# Patient Record
Sex: FEMALE | Race: WHITE | Employment: UNEMPLOYED | URBAN - METROPOLITAN AREA
[De-identification: names, ages, dates, MRNs, and addresses within clinical notes are randomized per-mention and may not be internally consistent; named-entity substitution may affect disease eponyms.]

---

## 2019-08-21 ENCOUNTER — ANESTHESIA EVENT (OUTPATIENT)
Dept: ANESTHESIOLOGY | Facility: HOSPITAL | Age: 54
End: 2019-08-21

## 2019-08-21 ENCOUNTER — ANESTHESIA (OUTPATIENT)
Dept: ANESTHESIOLOGY | Facility: HOSPITAL | Age: 54
End: 2019-08-21

## 2019-08-21 NOTE — ANESTHESIA POSTPROCEDURE EVALUATION
Post-Op Assessment Note    CV Status:  Stable  Pain Score: 0    Pain management: adequate     Mental Status:  Alert and awake   Hydration Status:  Euvolemic   PONV Controlled:  Controlled   Airway Patency:  Patent   Post Op Vitals Reviewed: Yes      Staff: CRNA           BP (P) 129/67 (08/21/19 0701)    Temp (!) (P) 97.3 °F (36.3 °C) (08/21/19 0701)    Pulse (P) 76 (08/21/19 0701)   Resp (P) 15 (08/21/19 0701)    SpO2 (P) 99 % (08/21/19 0701)

## 2022-08-22 ENCOUNTER — APPOINTMENT (OUTPATIENT)
Dept: RADIOLOGY | Facility: HOSPITAL | Age: 57
End: 2022-08-22
Payer: COMMERCIAL

## 2022-08-22 ENCOUNTER — ANESTHESIA EVENT (EMERGENCY)
Dept: PERIOP | Facility: HOSPITAL | Age: 57
End: 2022-08-22
Payer: COMMERCIAL

## 2022-08-22 ENCOUNTER — HOSPITAL ENCOUNTER (EMERGENCY)
Facility: HOSPITAL | Age: 57
Discharge: HOME | End: 2022-08-23
Attending: EMERGENCY MEDICINE | Admitting: EMERGENCY MEDICINE
Payer: COMMERCIAL

## 2022-08-22 ENCOUNTER — ANESTHESIA (EMERGENCY)
Dept: PERIOP | Facility: HOSPITAL | Age: 57
End: 2022-08-22
Payer: COMMERCIAL

## 2022-08-22 ENCOUNTER — APPOINTMENT (EMERGENCY)
Dept: PERIOP | Facility: HOSPITAL | Age: 57
End: 2022-08-22
Attending: INTERNAL MEDICINE
Payer: COMMERCIAL

## 2022-08-22 DIAGNOSIS — K25.9 GASTRIC ULCER WITHOUT HEMORRHAGE OR PERFORATION, UNSPECIFIED CHRONICITY: ICD-10-CM

## 2022-08-22 DIAGNOSIS — T18.128A ESOPHAGEAL OBSTRUCTION DUE TO FOOD IMPACTION: Primary | ICD-10-CM

## 2022-08-22 DIAGNOSIS — K22.10 EROSIVE ESOPHAGITIS: ICD-10-CM

## 2022-08-22 DIAGNOSIS — K22.2 ESOPHAGEAL OBSTRUCTION DUE TO FOOD IMPACTION: Primary | ICD-10-CM

## 2022-08-22 PROCEDURE — 43247 EGD REMOVE FOREIGN BODY: CPT | Performed by: INTERNAL MEDICINE

## 2022-08-22 PROCEDURE — NC001 PR NO CHARGE: Performed by: INTERNAL MEDICINE

## 2022-08-22 PROCEDURE — 99214 OFFICE O/P EST MOD 30 MIN: CPT | Performed by: INTERNAL MEDICINE

## 2022-08-22 PROCEDURE — 99284 EMERGENCY DEPT VISIT MOD MDM: CPT

## 2022-08-22 PROCEDURE — 99284 EMERGENCY DEPT VISIT MOD MDM: CPT | Performed by: EMERGENCY MEDICINE

## 2022-08-22 PROCEDURE — 96374 THER/PROPH/DIAG INJ IV PUSH: CPT

## 2022-08-22 RX ORDER — ONDANSETRON 2 MG/ML
INJECTION INTRAMUSCULAR; INTRAVENOUS AS NEEDED
Status: DISCONTINUED | OUTPATIENT
Start: 2022-08-22 | End: 2022-08-23

## 2022-08-22 RX ORDER — PANTOPRAZOLE SODIUM 40 MG/1
40 TABLET, DELAYED RELEASE ORAL
Qty: 120 TABLET | Refills: 1 | Status: SHIPPED | OUTPATIENT
Start: 2022-08-22

## 2022-08-22 RX ORDER — SUCCINYLCHOLINE/SOD CL,ISO/PF 100 MG/5ML
SYRINGE (ML) INTRAVENOUS AS NEEDED
Status: DISCONTINUED | OUTPATIENT
Start: 2022-08-22 | End: 2022-08-23

## 2022-08-22 RX ORDER — SODIUM CHLORIDE, SODIUM LACTATE, POTASSIUM CHLORIDE, CALCIUM CHLORIDE 600; 310; 30; 20 MG/100ML; MG/100ML; MG/100ML; MG/100ML
INJECTION, SOLUTION INTRAVENOUS CONTINUOUS PRN
Status: DISCONTINUED | OUTPATIENT
Start: 2022-08-22 | End: 2022-08-23

## 2022-08-22 RX ORDER — GLYCOPYRROLATE 0.2 MG/ML
INJECTION INTRAMUSCULAR; INTRAVENOUS AS NEEDED
Status: DISCONTINUED | OUTPATIENT
Start: 2022-08-22 | End: 2022-08-23

## 2022-08-22 RX ORDER — LIDOCAINE HYDROCHLORIDE 20 MG/ML
INJECTION, SOLUTION EPIDURAL; INFILTRATION; INTRACAUDAL; PERINEURAL AS NEEDED
Status: DISCONTINUED | OUTPATIENT
Start: 2022-08-22 | End: 2022-08-23

## 2022-08-22 RX ORDER — DEXAMETHASONE SODIUM PHOSPHATE 10 MG/ML
INJECTION, SOLUTION INTRAMUSCULAR; INTRAVENOUS AS NEEDED
Status: DISCONTINUED | OUTPATIENT
Start: 2022-08-22 | End: 2022-08-23

## 2022-08-22 RX ORDER — PROPOFOL 10 MG/ML
INJECTION, EMULSION INTRAVENOUS AS NEEDED
Status: DISCONTINUED | OUTPATIENT
Start: 2022-08-22 | End: 2022-08-23

## 2022-08-22 RX ADMIN — ONDANSETRON 4 MG: 2 INJECTION INTRAMUSCULAR; INTRAVENOUS at 23:36

## 2022-08-22 RX ADMIN — PROPOFOL 250 MG: 10 INJECTION, EMULSION INTRAVENOUS at 23:34

## 2022-08-22 RX ADMIN — DEXAMETHASONE SODIUM PHOSPHATE 10 MG: 10 INJECTION, SOLUTION INTRAMUSCULAR; INTRAVENOUS at 23:36

## 2022-08-22 RX ADMIN — GLYCOPYRROLATE 0.2 MG: 0.2 INJECTION, SOLUTION INTRAMUSCULAR; INTRAVENOUS at 23:15

## 2022-08-22 RX ADMIN — LIDOCAINE HYDROCHLORIDE 100 MG: 20 INJECTION, SOLUTION EPIDURAL; INFILTRATION; INTRACAUDAL; PERINEURAL at 23:34

## 2022-08-22 RX ADMIN — Medication 120 MG: at 23:34

## 2022-08-22 RX ADMIN — GLUCAGON HYDROCHLORIDE 1 MG: KIT at 22:20

## 2022-08-22 RX ADMIN — PROPOFOL 50 MG: 10 INJECTION, EMULSION INTRAVENOUS at 23:45

## 2022-08-22 RX ADMIN — SODIUM CHLORIDE, SODIUM LACTATE, POTASSIUM CHLORIDE, AND CALCIUM CHLORIDE: .6; .31; .03; .02 INJECTION, SOLUTION INTRAVENOUS at 23:29

## 2022-08-23 VITALS
DIASTOLIC BLOOD PRESSURE: 58 MMHG | OXYGEN SATURATION: 100 % | SYSTOLIC BLOOD PRESSURE: 133 MMHG | WEIGHT: 250 LBS | TEMPERATURE: 97.5 F | BODY MASS INDEX: 40.18 KG/M2 | RESPIRATION RATE: 18 BRPM | HEIGHT: 66 IN | HEART RATE: 82 BPM

## 2022-08-23 RX ORDER — HYDROMORPHONE HCL IN WATER/PF 6 MG/30 ML
0.2 PATIENT CONTROLLED ANALGESIA SYRINGE INTRAVENOUS
Status: DISCONTINUED | OUTPATIENT
Start: 2022-08-23 | End: 2022-08-23 | Stop reason: HOSPADM

## 2022-08-23 NOTE — ED NOTES
Pt attempted dysphagia screening  Took small sip of water and immediately vomited it up        Abhi Sanders, BEN  08/22/22 5142

## 2022-08-23 NOTE — ANESTHESIA PREPROCEDURE EVALUATION
Procedure:  EGD    Past Medical History:   Diagnosis Date    Esophageal obstruction due to food impaction      ETT Properties Placement Date: 12/22/20  - Placement Time: 0626 -JH Mask Ventilation: Mask ventilation not attempted (0)  -JH Preoxygenated: Yes  - Technique: Direct laryngoscopy;Rapid sequence;Stylet  - Type: Cuffed;Oral  -JH Tube Size: 7 mm  -JH Laryngoscope: Mac  - Blade Size: 3  -JH Location: Oral  - Grade View: 1  -JH Insertion: Atraumatic; No change in dentition  - Insertion attempts: 1  -JH Placement Verification: Auscultation; End tidal CO2  -JH Secured at (cm): 22  -JH Removal Date: 12/22/20  -FD Removal Time: 8019 -FD     Lab Results   Component Value Date    WBC 9 80 08/21/2019    HGB 14 9 08/21/2019    HCT 45 9 08/21/2019    MCV 88 08/21/2019     08/21/2019       Chemistry        Component Value Date/Time    K 3 4 (L) 08/21/2019 0136     08/21/2019 0136    CO2 25 08/21/2019 0136    BUN 17 08/21/2019 0136    CREATININE 0 77 08/21/2019 0136        Component Value Date/Time    CALCIUM 9 2 08/21/2019 0136    ALKPHOS 102 08/21/2019 0136    AST 22 08/21/2019 0136    ALT 26 08/21/2019 0136            Physical Exam    Airway    Mallampati score: II  TM Distance: >3 FB  Neck ROM: full     Dental   No notable dental hx     Cardiovascular  Rhythm: regular, Rate: normal,     Pulmonary  Breath sounds clear to auscultation,     Other Findings  Intercisor Distance > 3cm          Anesthesia Plan  ASA Score- 3 Emergent    Anesthesia Type- general with ASA Monitors  Additional Monitors:   Airway Plan: ETT  Comment: Discussed benefits/risks of general anesthesia including possibility of mouth/throat pain, injury to lips/teeth, nausea/vomiting, and surgical pain along with more rare complications such as stroke, MI, pneumonia, aspiration, and injury to blood vessels  Patient understands and wishes to proceed  All questions answered            Plan Factors-Exercise tolerance (METS): >4 METS  Chart reviewed  EKG reviewed  Existing labs reviewed  Induction- intravenous and rapid sequence induction  Postoperative Plan- Plan for postoperative opioid use  Planned trial extubation    Informed Consent- Anesthetic plan and risks discussed with patient  I personally reviewed this patient with the CRNA  Discussed and agreed on the Anesthesia Plan with the CRNA  Alicia Onofre

## 2022-08-23 NOTE — ED PROVIDER NOTES
History  Chief Complaint   Patient presents with    Foreign Body in [address] eating steak for dinner  Piece stuck in throat  Pt speaking in full sentences  Able to handle secretions  Failed dysphagia screen in triage  Patient is a 57-year-old female with a past medical history significant for hypertension, status post cholecystectomy, prior food bolus  Having required EGD for removal who today presents with foreign body in throat  Patient states that at 7:30 p m , she was eating steak for dinner, had 2 bites and then felt like a piece of steak was stuck in her throat  She states that she tried drinking water, forcing herself to vomit, but the steak still feels stuck  She is unable to swallow her own spit  She states that she previously had EGD from food bolus in  and prior to that she was diagnosed with esophagitis  Prior to Admission Medications   Prescriptions Last Dose Informant Patient Reported? Taking? Diclofenac Sodium (VOLTAREN) 1 % Not Taking at Unknown time  No No   Sig: Apply 2 g topically 4 (four) times a day   Patient not taking: No sig reported   pantoprazole (PROTONIX) 40 mg tablet Not Taking at Unknown time  No No   Sig: TAKE 1 TABLET BY MOUTH TWICE DAILY BEFORE  MEALS   Patient not taking: No sig reported      Facility-Administered Medications: None       Past Medical History:   Diagnosis Date    Esophageal obstruction due to food impaction        Past Surgical History:   Procedure Laterality Date     SECTION      GALLBLADDER SURGERY      UPPER GASTROINTESTINAL ENDOSCOPY         Family History   Problem Relation Age of Onset    Colon cancer Neg Hx     Colon polyps Neg Hx      I have reviewed and agree with the history as documented      E-Cigarette/Vaping    E-Cigarette Use Never User      E-Cigarette/Vaping Substances    Nicotine No     THC No     CBD No     Flavoring No     Other No     Unknown No      Social History     Tobacco Use    Smoking status: Never Smoker    Smokeless tobacco: Never Used   Vaping Use    Vaping Use: Never used   Substance Use Topics    Alcohol use: Never    Drug use: Never       Review of Systems   Constitutional: Negative for chills and fever  HENT: Positive for drooling and trouble swallowing  Negative for congestion, rhinorrhea and voice change  Eyes: Negative for photophobia and visual disturbance  Respiratory: Negative for cough and shortness of breath  Cardiovascular: Negative for chest pain and palpitations  Gastrointestinal: Negative for abdominal pain, constipation, diarrhea, nausea and vomiting  Genitourinary: Negative for dysuria, flank pain and hematuria  Musculoskeletal: Negative for back pain and neck pain  Skin: Negative for color change and pallor  Neurological: Negative for dizziness, weakness, light-headedness, numbness and headaches  Physical Exam  Physical Exam  Vitals and nursing note reviewed  Constitutional:       General: She is not in acute distress  Appearance: Normal appearance  She is not ill-appearing, toxic-appearing or diaphoretic  HENT:      Head: Normocephalic and atraumatic  Mouth/Throat:      Mouth: Mucous membranes are moist    Eyes:      Extraocular Movements: Extraocular movements intact  Conjunctiva/sclera: Conjunctivae normal       Pupils: Pupils are equal, round, and reactive to light  Neck:      Trachea: Trachea and phonation normal    Cardiovascular:      Rate and Rhythm: Normal rate and regular rhythm  Pulses: Normal pulses  Heart sounds: Normal heart sounds  No murmur heard  Pulmonary:      Effort: Pulmonary effort is normal  No respiratory distress  Breath sounds: Normal breath sounds  No stridor  No wheezing, rhonchi or rales  Chest:      Chest wall: No tenderness  Abdominal:      General: Bowel sounds are normal  There is no distension  Palpations: Abdomen is soft  Tenderness:  There is no abdominal tenderness  There is no guarding or rebound  Musculoskeletal:      Cervical back: Neck supple  Right lower leg: No edema  Left lower leg: No edema  Skin:     General: Skin is warm and dry  Neurological:      General: No focal deficit present  Mental Status: She is alert and oriented to person, place, and time  Mental status is at baseline  Psychiatric:         Mood and Affect: Mood normal          Behavior: Behavior normal          Vital Signs  ED Triage Vitals [08/22/22 2130]   Temperature Pulse Respirations Blood Pressure SpO2   (!) 97 3 °F (36 3 °C) 83 18 149/78 98 %      Temp Source Heart Rate Source Patient Position - Orthostatic VS BP Location FiO2 (%)   Temporal Monitor Sitting Right arm --      Pain Score       No Pain           Vitals:    08/22/22 2130   BP: 149/78   Pulse: 83   Patient Position - Orthostatic VS: Sitting         Visual Acuity      ED Medications  Medications   glucagon (GLUCAGEN) injection 1 mg (1 mg Intravenous Given 8/22/22 2220)       Diagnostic Studies  Results Reviewed     None                 No orders to display              Procedures  Procedures         ED Course  ED Course as of 08/22/22 2308   [facility] Aug 22, 2022   2219 Discussed with Dr Ariella Self, GI for EG in setting of steak bolus  2301 Glucagon did not work, GI mobilizing for EGD                               SBIRT 22yo+    Flowsheet Row Most Recent Value   SBIRT (25 yo +)    In order to provide better care to our patients, we are screening all of our patients for alcohol and drug use  Would it be okay to ask you these screening questions? Yes Filed at: 08/22/2022 2223   Initial Alcohol Screen: US AUDIT-C     1  How often do you have a drink containing alcohol? 0 Filed at: 08/22/2022 2223   2  How many drinks containing alcohol do you have on a typical day you are drinking? 0 Filed at: 08/22/2022 2223   3a  Male UNDER 65: How often do you have five or more drinks on one occasion?  0 Filed at: 08/22/2022 2223 3b  FEMALE Any Age, or MALE 65+: How often do you have 4 or more drinks on one occassion? 0 Filed at: 08/22/2022 2223   Audit-C Score 0 Filed at: 08/22/2022 2223   SHARA: How many times in the past year have you    Used an illegal drug or used a prescription medication for non-medical reasons? Never Filed at: 08/22/2022 2223                    MDM  Number of Diagnoses or Management Options  Esophageal obstruction due to food impaction  Diagnosis management comments: Assessment and plan:  58-year-old female presenting with food bolus  Will trial glue gone, if does not work, will discuss case with GI for urgent endoscopy for food bolus retrieval   On exam, the patient is able to speak in full sentences, but she does spit her saliva out intermittently  Disposition  Final diagnoses:   Esophageal obstruction due to food impaction     Time reflects when diagnosis was documented in both MDM as applicable and the Disposition within this note     Time User Action Codes Description Comment    8/22/2022 10:20 PM Susie Escalera [K22 2,  I44 227D] Esophageal obstruction due to food impaction       ED Disposition     ED Disposition   Send to OR    Condition   --    Date/Time   Mon Aug 22, 2022 10:20 PM    Comment   --         Follow-up Information    None         Patient's Medications   Discharge Prescriptions    No medications on file       No discharge procedures on file      PDMP Review     None          ED Provider  Electronically Signed by           Brigida Nguyen DO  08/22/22 1227

## 2022-08-23 NOTE — CONSULTS
Consultation - [facility]     Tania Villagomez 59 y o  female MRN: [id number]  Unit/Bed#: TR13B Encounter: [id number]    Inpatient consult to gastroenterology  Consult performed by: Garry Mckinney MD  Consult ordered by: Garry Mckinney MD          ASSESSMENT and PLAN    1  Esophageal food impaction, recurrent episode    58-year-old female with steak in the esophagus stuck since 6:00 p m  Jennifer Domingo Unable to clear secretions  No A/C     - NPO IV fluids  - urgent EGD in the OR with elective intubation for airway protection    Chief Complaint   Patient presents with    Foreign Body in [address] eating steak for dinner  Piece stuck in throat  Pt speaking in full sentences  Able to handle secretions  Failed dysphagia screen in triage  Physician Requesting Consult: Jonny Vargas DO    HPI  Tania Villagomez is a 59y o  year old female who presents today with history of food impaction, last in , who returns for steak stuck in her esophagus since 6:00 p m  Today  Unable to clear secretions  Historical Information   Past Medical History:   Diagnosis Date    Esophageal obstruction due to food impaction      Past Surgical History:   Procedure Laterality Date     SECTION      GALLBLADDER SURGERY      UPPER GASTROINTESTINAL ENDOSCOPY       Social History   Social History     Substance and Sexual Activity   Alcohol Use Never     Social History     Substance and Sexual Activity   Drug Use Never     Social History     Tobacco Use   Smoking Status Never Smoker   Smokeless Tobacco Never Used     Family History   Problem Relation Age of Onset    Colon cancer Neg Hx     Colon polyps Neg Hx        Meds/Allergies     No home medications   Not compliant with PPI therapy    No Known Allergies    PHYSICAL EXAM    Blood pressure 149/78, pulse 83, temperature (!) 97 3 °F (36 3 °C), temperature source Temporal, resp  rate 18, height 5' 6" (1 676 m), weight 113 kg (250 lb), SpO2 98 %   Body mass index is 40 35 kg/m²  General Appearance: NAD, cooperative, alert  Eyes: Anicteric, PERRLA, EOMI  ENT:  Normocephalic, atraumatic, normal mucosa  unable to clear secretions  Neck:  Supple, symmetrical, trachea midline  Resp:  Clear to auscultation bilaterally; no rales, rhonchi or wheezing; respirations unlabored   CV:  S1 S2, Regular rate and rhythm; no murmur, rub, or gallop  GI:  Soft, non-tender, non-distended; normal bowel sounds; no masses, no organomegaly   Rectal: Deferred  Musculoskeletal: No cyanosis, clubbing or edema  Normal ROM  Skin:  No jaundice, rashes, or lesions   Heme/Lymph: No palpable cervical lymphadenopathy  Psych: Normal affect, good eye contact  Neuro: No gross deficits, AAOx3    Lab Results   Component Value Date    CALCIUM 9 2 08/21/2019    K 3 4 (L) 08/21/2019    CO2 25 08/21/2019     08/21/2019    BUN 17 08/21/2019    CREATININE 0 77 08/21/2019     Lab Results   Component Value Date    WBC 9 80 08/21/2019    HGB 14 9 08/21/2019    HCT 45 9 08/21/2019    MCV 88 08/21/2019     08/21/2019     Lab Results   Component Value Date    ALT 26 08/21/2019    AST 22 08/21/2019    ALKPHOS 102 08/21/2019     No results found for: AMYLASE  No results found for: LIPASE  No results found for: IRON, TIBC, FERRITIN  No results found for: INR      REVIEW OF SYSTEMS:    CONSTITUTIONAL: Denies any fever, chills, rigors, and weight loss  HEENT: No earache or tinnitus  Denies hearing loss or visual disturbances  CARDIOVASCULAR: No chest pain or palpitations  RESPIRATORY: Denies any cough, hemoptysis, shortness of breath or dyspnea on exertion  GASTROINTESTINAL: As noted in the History of Present Illness  GENITOURINARY: No problems with urination  Denies any hematuria or dysuria  NEUROLOGIC: No dizziness or vertigo, denies headaches  MUSCULOSKELETAL: Denies any muscle or joint pain  SKIN: Denies skin rashes or itching     ENDOCRINE: Denies excessive thirst  Denies intolerance to heat or cold  PSYCHOSOCIAL: Denies depression or anxiety  Denies any recent memory loss

## 2022-08-23 NOTE — ANESTHESIA POSTPROCEDURE EVALUATION
Post-Op Assessment Note    CV Status:  Stable  Pain Score: 0    Pain management: adequate     Mental Status:  Alert and awake   Hydration Status:  Euvolemic   PONV Controlled:  Controlled   Airway Patency:  Patent      Post Op Vitals Reviewed: Yes      Staff: CRNA         No complications documented      BP   151/67   Temp  96 9   Pulse  86   Resp   17   SpO2   100% 4lo2 nc

## 2022-08-23 NOTE — ED NOTES
Attempted X2 for IV access, unsuccessful  Will attempt ultrasound IV placement        Elvira Adams RN  08/22/22 8352

## 2022-08-23 NOTE — OP NOTE
[facility] [address] [facility]  [facility] [place] [address]  [phone]        DATE OF SERVICE:  8/22/22     PHYSICIAN(S):  Collin Calle MD        INDICATION:  Esophageal obstruction due to food impaction     POST-OP DIAGNOSIS:  See the impression below      PREPROCEDURE:  Informed consent was obtained for the procedure, including sedation  Risks of perforation, hemorrhage, adverse drug reaction and aspiration were discussed  The patient was placed in the left lateral decubitus position      Patient was explained about the risks and benefits of the procedure  Risks including but not limited to bleeding, infection, and perforation were explained in detail  Also explained about less than 100% sensitivity with the exam and other alternatives      DETAILS OF PROCEDURE:  Patient was taken to the procedure room where a time out was performed to confirm correct patient and correct procedure  The patient underwent general anesthesia, which was administered by an anesthesia professional  The patient's blood pressure, heart rate, level of consciousness, respirations and oxygen were monitored throughout the procedure  The scope was advanced to the second part of the duodenum  Retroflexion was performed in the fundus  The patient experienced no blood loss  The procedure was not difficult  The patient tolerated the procedure well  There were no apparent complications       ANESTHESIA INFORMATION:  ASA: ASA status not filed in the log    Anesthesia Type: Anesthesia type not filed in the log      MEDICATIONS:  No administrations occurring from 2311 to 2354 on 08/22/22         FINDINGS:  · Food bolus measuring 4 cm in the middle third of the esophagus and lower third of the esophagus (28 cm from the incisors), successfully removed with retrieval snare net and snare  · Multiple small, cratered, round ulcers in the antrum  · The duodenum appeared normal   · The fundus of the stomach and incisura appeared normal   · Severe grade D esophagitis with multiple mucosal breaks measuring 5 mm or more, continuous between folds, covering 75% or more of the circumference, showing edematous, erythematous, nodular and ulcerated mucosa in the GE junction        SPECIMENS:  * No specimens in log *        IMPRESSION:  Large piece of steak stuck in the lower 3rd of the esophagus, removed with  Mew Net and snare  Grade D esophagitis with nodular GE junction  Multiple small antral ulcers  Normal duodenum      RECOMMENDATION:  Schedule repeat EGD in 2 months to follow-up ulcers and biopsy GE junction  Start PPI b i d  Resume home meds  Resume previous diet  Follow up with your primary care provider as previously scheduled  Follow up with GI for repeat EGD as stated above      Patient Disposition:  hemodynamically stable      SIGNATURE: Sherita Bain MD  DATE: August 22, 2022  TIME: 11:59 PM

## 2022-11-01 ENCOUNTER — ANESTHESIA (EMERGENCY)
Dept: PERIOP | Facility: HOSPITAL | Age: 57
End: 2022-11-01

## 2022-11-01 ENCOUNTER — APPOINTMENT (EMERGENCY)
Dept: PERIOP | Facility: HOSPITAL | Age: 57
End: 2022-11-01
Attending: INTERNAL MEDICINE

## 2022-11-01 ENCOUNTER — ANESTHESIA EVENT (EMERGENCY)
Dept: PERIOP | Facility: HOSPITAL | Age: 57
End: 2022-11-01

## 2022-11-01 ENCOUNTER — HOSPITAL ENCOUNTER (EMERGENCY)
Facility: HOSPITAL | Age: 57
Discharge: HOME | End: 2022-11-01
Attending: EMERGENCY MEDICINE

## 2022-11-01 VITALS
WEIGHT: 250 LBS | TEMPERATURE: 98 F | HEIGHT: 66 IN | BODY MASS INDEX: 40.18 KG/M2 | OXYGEN SATURATION: 100 % | SYSTOLIC BLOOD PRESSURE: 147 MMHG | HEART RATE: 85 BPM | RESPIRATION RATE: 12 BRPM | DIASTOLIC BLOOD PRESSURE: 71 MMHG

## 2022-11-01 DIAGNOSIS — T17.208A FOREIGN BODY IN PHARYNX, INITIAL ENCOUNTER: ICD-10-CM

## 2022-11-01 DIAGNOSIS — K22.2 ESOPHAGEAL OBSTRUCTION DUE TO FOOD IMPACTION: Primary | ICD-10-CM

## 2022-11-01 DIAGNOSIS — T18.128A ESOPHAGEAL OBSTRUCTION DUE TO FOOD IMPACTION: Primary | ICD-10-CM

## 2022-11-01 DIAGNOSIS — T17.208D FOREIGN BODY IN PHARYNX, SUBSEQUENT ENCOUNTER: ICD-10-CM

## 2022-11-01 LAB
ATRIAL RATE: 105 BPM
P AXIS: 48 DEGREES
PR INTERVAL: 156 MS
QRS AXIS: -27 DEGREES
QRSD INTERVAL: 84 MS
QT INTERVAL: 350 MS
QTC INTERVAL: 462 MS
T WAVE AXIS: 41 DEGREES
VENTRICULAR RATE: 105 BPM

## 2022-11-01 RX ORDER — ONDANSETRON 2 MG/ML
4 INJECTION INTRAMUSCULAR; INTRAVENOUS ONCE AS NEEDED
Status: DISCONTINUED | OUTPATIENT
Start: 2022-11-01 | End: 2022-11-02 | Stop reason: HOSPADM

## 2022-11-01 RX ORDER — ONDANSETRON 2 MG/ML
INJECTION INTRAMUSCULAR; INTRAVENOUS AS NEEDED
Status: DISCONTINUED | OUTPATIENT
Start: 2022-11-01 | End: 2022-11-01

## 2022-11-01 RX ORDER — SODIUM CHLORIDE, SODIUM LACTATE, POTASSIUM CHLORIDE, CALCIUM CHLORIDE 600; 310; 30; 20 MG/100ML; MG/100ML; MG/100ML; MG/100ML
50 INJECTION, SOLUTION INTRAVENOUS CONTINUOUS
Status: DISCONTINUED | OUTPATIENT
Start: 2022-11-01 | End: 2022-11-02 | Stop reason: HOSPADM

## 2022-11-01 RX ORDER — FENTANYL CITRATE/PF 50 MCG/ML
25 SYRINGE (ML) INJECTION
Status: DISCONTINUED | OUTPATIENT
Start: 2022-11-01 | End: 2022-11-02 | Stop reason: HOSPADM

## 2022-11-01 RX ORDER — SUCCINYLCHOLINE/SOD CL,ISO/PF 100 MG/5ML
SYRINGE (ML) INTRAVENOUS AS NEEDED
Status: DISCONTINUED | OUTPATIENT
Start: 2022-11-01 | End: 2022-11-01

## 2022-11-01 RX ORDER — PROPOFOL 10 MG/ML
INJECTION, EMULSION INTRAVENOUS AS NEEDED
Status: DISCONTINUED | OUTPATIENT
Start: 2022-11-01 | End: 2022-11-01

## 2022-11-01 RX ORDER — FENTANYL CITRATE 50 UG/ML
INJECTION, SOLUTION INTRAMUSCULAR; INTRAVENOUS AS NEEDED
Status: DISCONTINUED | OUTPATIENT
Start: 2022-11-01 | End: 2022-11-01

## 2022-11-01 RX ORDER — SODIUM CHLORIDE, SODIUM LACTATE, POTASSIUM CHLORIDE, CALCIUM CHLORIDE 600; 310; 30; 20 MG/100ML; MG/100ML; MG/100ML; MG/100ML
INJECTION, SOLUTION INTRAVENOUS CONTINUOUS PRN
Status: DISCONTINUED | OUTPATIENT
Start: 2022-11-01 | End: 2022-11-01

## 2022-11-01 RX ORDER — DEXAMETHASONE SODIUM PHOSPHATE 10 MG/ML
INJECTION, SOLUTION INTRAMUSCULAR; INTRAVENOUS AS NEEDED
Status: DISCONTINUED | OUTPATIENT
Start: 2022-11-01 | End: 2022-11-01

## 2022-11-01 RX ORDER — SODIUM CHLORIDE 9 MG/ML
INJECTION, SOLUTION INTRAVENOUS CONTINUOUS PRN
Status: DISCONTINUED | OUTPATIENT
Start: 2022-11-01 | End: 2022-11-01

## 2022-11-01 RX ADMIN — SODIUM CHLORIDE, SODIUM LACTATE, POTASSIUM CHLORIDE, AND CALCIUM CHLORIDE: .6; .31; .03; .02 INJECTION, SOLUTION INTRAVENOUS at 02:51

## 2022-11-01 RX ADMIN — ONDANSETRON 4 MG: 2 INJECTION INTRAMUSCULAR; INTRAVENOUS at 03:22

## 2022-11-01 RX ADMIN — FENTANYL CITRATE 50 MCG: 50 INJECTION, SOLUTION INTRAMUSCULAR; INTRAVENOUS at 03:21

## 2022-11-01 RX ADMIN — FENTANYL CITRATE 25 MCG: 50 INJECTION, SOLUTION INTRAMUSCULAR; INTRAVENOUS at 03:58

## 2022-11-01 RX ADMIN — DEXAMETHASONE SODIUM PHOSPHATE 10 MG: 10 INJECTION, SOLUTION INTRAMUSCULAR; INTRAVENOUS at 03:22

## 2022-11-01 RX ADMIN — PROPOFOL 300 MG: 10 INJECTION, EMULSION INTRAVENOUS at 03:15

## 2022-11-01 RX ADMIN — LIDOCAINE HYDROCHLORIDE 100 MG: 20 INJECTION INTRAVENOUS at 03:15

## 2022-11-01 RX ADMIN — FENTANYL CITRATE 25 MCG: 50 INJECTION, SOLUTION INTRAMUSCULAR; INTRAVENOUS at 04:10

## 2022-11-01 RX ADMIN — Medication 140 MG: at 03:15

## 2022-11-01 NOTE — ANESTHESIA POSTPROCEDURE EVALUATION
Post-Op Assessment Note    CV Status:  Stable  Pain Score: 0    Pain management: adequate     Mental Status:  Sleepy   Hydration Status:  Stable   PONV Controlled:  None   Airway Patency:  Patent  Airway: intubated      Post Op Vitals Reviewed: Yes      Staff: Anesthesiologist, CRNA         No complications documented      /75 (11/01/22 0413)    Temp 98 °F (36 7 °C) (11/01/22 0413)    Pulse 83 (11/01/22 0413)   Resp 16 (11/01/22 0413)    SpO2 100 % (11/01/22 0413)

## 2022-11-01 NOTE — ED NOTES
Pt rang call bell stating that she feels like it's difficult to breathe  Oxygen saturation 100% on RA, respiratory rate WDL     Dr Arthur Lofton made aware, waiting to hear back from ROGELIO Godfrey RN  11/01/22 8988

## 2022-11-01 NOTE — BRIEF OP NOTE (RAD/CATH)
EGD    No further retching or regurgitation  Patient states she still has the chest discomfort she felt initially with the impaction prior to the endoscopy  On exam lungs are clear, there is no chest wall tenderness or crepitus, heart regular rhythm and abdomen is soft and nontender      IMPRESSION:  Food impaction occupying distal esophagus removed piecemeal  Benign appearing stricture EG junction  Antral/pre-pyloric erosive gastritis    RECOMMENDATION:  Reflux diet and precautions, avoid fatty foods caffeine and any trigger foods  Cut and chew food well, take time eating  Encourage fluids  Continue pantoprazole twice a day  GI office follow-up in 4-6 weeks  Repeat EGD with biopsy and dilatation in 2-3 months

## 2022-11-01 NOTE — ED PROVIDER NOTES
History  Chief Complaint   Patient presents with   • Foreign Body in Throat     Pt has pulled pork stuck in throat for 5 hours, no problems breathing  This is a 55-year-old female who presents with esophageal foreign body after eating pork approximately 5 hours ago has had requirement for EEG T in the past several times      History provided by:  Patient  Medical Problem  Location:   esophageal  Quality:   foreign body  Severity:  Severe  Onset quality:  Sudden  Duration:  5 hours  Timing:  Constant  Progression:  Unchanged  Chronicity:  Recurrent  Context:   esophageal foreign body after eating pork      Prior to Admission Medications   Prescriptions Last Dose Informant Patient Reported? Taking? pantoprazole (PROTONIX) 40 mg tablet   No No   Sig: Take 1 tablet (40 mg total) by mouth 2 (two) times a day before meals      Facility-Administered Medications: None       Past Medical History:   Diagnosis Date   • Esophageal obstruction due to food impaction        Past Surgical History:   Procedure Laterality Date   •  SECTION     • GALLBLADDER SURGERY     • UPPER GASTROINTESTINAL ENDOSCOPY         Family History   Problem Relation Age of Onset   • Colon cancer Neg Hx    • Colon polyps Neg Hx      I have reviewed and agree with the history as documented  E-Cigarette/Vaping   • E-Cigarette Use Never User      E-Cigarette/Vaping Substances   • Nicotine No    • THC No    • CBD No    • Flavoring No    • Other No    • Unknown No      Social History     Tobacco Use   • Smoking status: Never Smoker   • Smokeless tobacco: Never Used   Vaping Use   • Vaping Use: Never used   Substance Use Topics   • Alcohol use: Never   • Drug use: Never       Review of Systems   HENT: Positive for trouble swallowing  All other systems reviewed and are negative  Physical Exam  Physical Exam  Vitals and nursing note reviewed  Constitutional:       General: She is in acute distress        Appearance: She is not toxic-appearing or diaphoretic  HENT:      Head: Normocephalic and atraumatic  Right Ear: External ear normal       Left Ear: External ear normal       Nose: Nose normal    Eyes:      General: No scleral icterus  Right eye: No discharge  Left eye: No discharge  Extraocular Movements: Extraocular movements intact  Pupils: Pupils are equal, round, and reactive to light  Cardiovascular:      Rate and Rhythm: Normal rate and regular rhythm  Pulses: Normal pulses  Heart sounds: No murmur heard  No friction rub  No gallop  Pulmonary:      Effort: Pulmonary effort is normal  No respiratory distress  Breath sounds: No stridor  No wheezing, rhonchi or rales  Abdominal:      General: There is no distension  Palpations: Abdomen is soft  Tenderness: There is no abdominal tenderness  There is no guarding or rebound  Hernia: No hernia is present  Musculoskeletal:         General: No swelling, tenderness, deformity or signs of injury  Normal range of motion  Cervical back: Normal range of motion and neck supple  No rigidity or tenderness  Right lower leg: No edema  Left lower leg: No edema  Skin:     General: Skin is warm and dry  Coloration: Skin is not jaundiced  Findings: No bruising, erythema or rash  Neurological:      General: No focal deficit present  Mental Status: She is alert and oriented to person, place, and time  Cranial Nerves: No cranial nerve deficit  Sensory: No sensory deficit  Coordination: Coordination normal    Psychiatric:         Behavior: Behavior normal          Thought Content:  Thought content normal          Vital Signs  ED Triage Vitals   Temperature Pulse Respirations Blood Pressure SpO2   11/01/22 0046 11/01/22 0046 11/01/22 0046 11/01/22 0046 11/01/22 0046   97 8 °F (36 6 °C) (!) 110 18 139/83 99 %      Temp Source Heart Rate Source Patient Position - Orthostatic VS BP Location FiO2 (%)   11/01/22 0046 11/01/22 0100 11/01/22 0046 11/01/22 0046 --   Temporal Monitor Lying Right arm       Pain Score       --                  Vitals:    11/01/22 0046 11/01/22 0100 11/01/22 0130   BP: 139/83 130/66 134/91   Pulse: (!) 110 96 91   Patient Position - Orthostatic VS: Lying Sitting Sitting         Visual Acuity      ED Medications  Medications - No data to display    Diagnostic Studies  Results Reviewed     None                 No orders to display              Procedures  ECG 12 Lead Documentation Only    Date/Time: 11/1/2022 1:11 AM  Performed by: Nicki Cueto DO  Authorized by: Nicki Cueto DO     ECG reviewed by me, the ED Provider: yes    Patient location:  ED  Rate:     ECG rate:  105    ECG rate assessment: tachycardic    Rhythm:     Rhythm: sinus rhythm    Conduction:     Conduction: normal    T waves:     T waves: normal               ED Course  ED Course as of 11/01/22 0155   Tue Nov 01, 2022   0151  Spoke with GI who will come in and take patient to Endoscopy Lab                               SBIRT 22yo+    Flowsheet Row Most Recent Value   SBIRT (23 yo +)    In order to provide better care to our patients, we are screening all of our patients for alcohol and drug use  Would it be okay to ask you these screening questions? Yes Filed at: 11/01/2022 0121   Initial Alcohol Screen: US AUDIT-C     1  How often do you have a drink containing alcohol? 0 Filed at: 11/01/2022 0121   2  How many drinks containing alcohol do you have on a typical day you are drinking? 0 Filed at: 11/01/2022 0121   3a  Male UNDER 65: How often do you have five or more drinks on one occasion? 0 Filed at: 11/01/2022 0121   3b  FEMALE Any Age, or MALE 65+: How often do you have 4 or more drinks on one occassion? 0 Filed at: 11/01/2022 0121   Audit-C Score 0 Filed at: 11/01/2022 0121   SHARA: How many times in the past year have you        Used an illegal drug or used a prescription medication for non-medical reasons? Never Filed at: 11/01/2022 0121                    MDM    Disposition  Final diagnoses:   Esophageal obstruction due to food impaction     Time reflects when diagnosis was documented in both MDM as applicable and the Disposition within this note     Time User Action Codes Description Comment    11/1/2022  1:55 AM Leonidas Lorenzana [K22 2,  [id number]] Esophageal obstruction due to food impaction       ED Disposition     ED Disposition   Send to OR    Condition   --    Date/Time   Tue Nov 1, 2022  1:55 AM    Comment   --         Follow-up Information    None         Patient's Medications   Discharge Prescriptions    No medications on file       No discharge procedures on file      PDMP Review     None          ED Provider  Electronically Signed by           Grazyna Hawley DO  11/01/22 0155

## 2022-11-01 NOTE — CONSULTS
Consultation - [address] [facility]     Say Kamara 59 y o  female MRN: [id number]  Unit/Bed#: BEENA Encounter: [id number]    Consults    ASSESSMENT and PLAN    Foreign body/food impaction - 64-year-old female with history of reflux esophagitis and past food impaction presents with the same  Was eating shredded pork this evening around 5:00 p m  And now unable to take liquids or clear secretions  No hematemesis melena or hematochezia  · n  p o  And IV fluids  · Continue PPI  · Arrange urgent EGD    Chief Complaint   Patient presents with   • Foreign Body in Throat     Pt has pulled pork stuck in throat for 5 hours, no problems breathing  Physician Requesting Consult: DO MIKE Nicole  Say Kamara is a 59y o  year old female with history of reflux esophagitis and recurrent food impactions presents with the same  Had pork dinner tonight  Acutely became unable to eat and swallow  Regurgitating liquids and secretions  No hematemesis  No melena or hematochezia  Denies aspirin or NSAIDs  No tobacco or alcohol  Has not had follow-up EGD since her last incident in August     Historical Information   Past Medical History:   Diagnosis Date   • Esophageal obstruction due to food impaction      Past Surgical History:   Procedure Laterality Date   •  SECTION     • GALLBLADDER SURGERY     • UPPER GASTROINTESTINAL ENDOSCOPY       Social History   Social History     Substance and Sexual Activity   Alcohol Use Never     Social History     Substance and Sexual Activity   Drug Use Never     Social History     Tobacco Use   Smoking Status Never Smoker   Smokeless Tobacco Never Used     Family History   Problem Relation Age of Onset   • Colon cancer Neg Hx    • Colon polyps Neg Hx        Meds/Allergies     No current facility-administered medications for this encounter       Facility-Administered Medications Ordered in Other Encounters   Medication Dose Route Frequency   • sodium chloride 0 9 % infusion   Intravenous Continuous PRN     (Not in a hospital admission)      No Known Allergies    PHYSICAL EXAM    Blood pressure 127/65, pulse 95, temperature 98 °F (36 7 °C), temperature source Tympanic, resp  rate 12, height 5' 6" (1 676 m), weight 113 kg (250 lb), SpO2 95 %  Body mass index is 40 35 kg/m²  General Appearance: NAD, anxious, alert  Eyes: Anicteric, PERRLA, EOMI  ENT:  Normocephalic, atraumatic, normal mucosa  Neck:  Supple, symmetrical, trachea midline  Resp:  Clear to auscultation bilaterally; no rales, rhonchi or wheezing; respirations unlabored   CV:  S1 S2, Regular rate and rhythm; no murmur, rub, or gallop  GI:  Soft, non-tender, obese but non-distended; normal bowel sounds; no masses, no organomegaly   Rectal: Deferred  Musculoskeletal: No cyanosis, clubbing or edema  Normal ROM  Skin:  No jaundice, rashes, or lesions   Heme/Lymph: No palpable cervical lymphadenopathy  Psych: Normal affect, good eye contact  Neuro: No gross deficits, AAOx3    Lab Results   Component Value Date    CALCIUM 9 2 08/21/2019    K 3 4 (L) 08/21/2019    CO2 25 08/21/2019     08/21/2019    BUN 17 08/21/2019    CREATININE 0 77 08/21/2019     Lab Results   Component Value Date    WBC 9 80 08/21/2019    HGB 14 9 08/21/2019    HCT 45 9 08/21/2019    MCV 88 08/21/2019     08/21/2019     Lab Results   Component Value Date    ALT 26 08/21/2019    AST 22 08/21/2019    ALKPHOS 102 08/21/2019     No results found for: AMYLASE  No results found for: LIPASE  No results found for: IRON, TIBC, FERRITIN  No results found for: INR    Imaging Studies: I have personally reviewed pertinent reports  EKG, Pathology, and Other Studies: I have personally reviewed pertinent reports  REVIEW OF SYSTEMS:    CONSTITUTIONAL: Denies any fever, chills, rigors, and weight loss  HEENT: No earache or tinnitus  Denies hearing loss or visual disturbances  CARDIOVASCULAR: No chest pain or palpitations  RESPIRATORY: Denies any cough, hemoptysis, shortness of breath or dyspnea on exertion  GASTROINTESTINAL: As noted in the History of Present Illness  GENITOURINARY: No problems with urination  Denies any hematuria or dysuria  NEUROLOGIC: No dizziness or vertigo, denies headaches  MUSCULOSKELETAL: Denies any muscle or joint pain  SKIN: Denies skin rashes or itching  ENDOCRINE: Denies excessive thirst  Denies intolerance to heat or cold  PSYCHOSOCIAL: Denies depression or anxiety  Denies any recent memory loss

## 2022-11-01 NOTE — ANESTHESIA PREPROCEDURE EVALUATION
Procedure:  EGD  Recent FB removal in 8/22,  Today with 5 hour hx  Of pork ingestion  Relevant Problems   No relevant active problems      No recent URI  Non smoker  BMI-41  Physical Exam    Airway    Mallampati score: II  TM Distance: >3 FB  Neck ROM: full     Dental   No notable dental hx     Cardiovascular      Pulmonary      Other Findings        Anesthesia Plan  ASA Score- 3 Emergent    Anesthesia Type- general with ASA Monitors  Additional Monitors:   Airway Plan:           Plan Factors-Exercise tolerance (METS): >4 METS  Chart reviewed  EKG reviewed  Patient summary reviewed  Patient is not a current smoker  Induction- intravenous  Postoperative Plan-     Informed Consent- Anesthetic plan and risks discussed with patient and spouse  I personally reviewed this patient with the CRNA  Discussed and agreed on the Anesthesia Plan with the CRNA  Luca Currie

## 2023-01-06 ENCOUNTER — ANESTHESIA EVENT (EMERGENCY)
Dept: PERIOP | Facility: HOSPITAL | Age: 58
End: 2023-01-06

## 2023-01-06 ENCOUNTER — APPOINTMENT (OUTPATIENT)
Dept: RADIOLOGY | Facility: HOSPITAL | Age: 58
End: 2023-01-06

## 2023-01-06 ENCOUNTER — ANESTHESIA (EMERGENCY)
Dept: PERIOP | Facility: HOSPITAL | Age: 58
End: 2023-01-06

## 2023-01-06 ENCOUNTER — HOSPITAL ENCOUNTER (EMERGENCY)
Facility: HOSPITAL | Age: 58
Discharge: HOME | End: 2023-01-06
Attending: EMERGENCY MEDICINE | Admitting: EMERGENCY MEDICINE

## 2023-01-06 ENCOUNTER — APPOINTMENT (EMERGENCY)
Dept: PERIOP | Facility: HOSPITAL | Age: 58
End: 2023-01-06
Attending: INTERNAL MEDICINE

## 2023-01-06 VITALS
DIASTOLIC BLOOD PRESSURE: 68 MMHG | TEMPERATURE: 98.2 F | SYSTOLIC BLOOD PRESSURE: 138 MMHG | RESPIRATION RATE: 12 BRPM | HEART RATE: 82 BPM | OXYGEN SATURATION: 100 %

## 2023-01-06 DIAGNOSIS — T18.128A ESOPHAGEAL OBSTRUCTION DUE TO FOOD IMPACTION: Primary | ICD-10-CM

## 2023-01-06 DIAGNOSIS — K25.9 GASTRIC ULCER WITHOUT HEMORRHAGE OR PERFORATION, UNSPECIFIED CHRONICITY: ICD-10-CM

## 2023-01-06 DIAGNOSIS — K22.2 ESOPHAGEAL OBSTRUCTION DUE TO FOOD IMPACTION: Primary | ICD-10-CM

## 2023-01-06 DIAGNOSIS — K22.10 EROSIVE ESOPHAGITIS: ICD-10-CM
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
## 2023-01-06 RX ORDER — FENTANYL CITRATE/PF 50 MCG/ML
25 SYRINGE (ML) INJECTION
Status: DISCONTINUED | OUTPATIENT
Start: 2023-01-06 | End: 2023-01-06 | Stop reason: HOSPADM

## 2023-01-06 RX ORDER — SODIUM CHLORIDE, SODIUM LACTATE, POTASSIUM CHLORIDE, CALCIUM CHLORIDE 600; 310; 30; 20 MG/100ML; MG/100ML; MG/100ML; MG/100ML
INJECTION, SOLUTION INTRAVENOUS CONTINUOUS PRN
Status: DISCONTINUED | OUTPATIENT
Start: 2023-01-06 | End: 2023-01-06

## 2023-01-06 RX ORDER — PROPOFOL 10 MG/ML
INJECTION, EMULSION INTRAVENOUS AS NEEDED
Status: DISCONTINUED | OUTPATIENT
Start: 2023-01-06 | End: 2023-01-06

## 2023-01-06 RX ORDER — ONDANSETRON 2 MG/ML
INJECTION INTRAMUSCULAR; INTRAVENOUS AS NEEDED
Status: DISCONTINUED | OUTPATIENT
Start: 2023-01-06 | End: 2023-01-06

## 2023-01-06 RX ORDER — PANTOPRAZOLE SODIUM 40 MG/1
40 TABLET, DELAYED RELEASE ORAL
Qty: 60 TABLET | Refills: 3 | Status: SHIPPED | OUTPATIENT
Start: 2023-01-06

## 2023-01-06 RX ORDER — DEXAMETHASONE SODIUM PHOSPHATE 10 MG/ML
INJECTION, SOLUTION INTRAMUSCULAR; INTRAVENOUS AS NEEDED
Status: DISCONTINUED | OUTPATIENT
Start: 2023-01-06 | End: 2023-01-06

## 2023-01-06 RX ORDER — ONDANSETRON 2 MG/ML
4 INJECTION INTRAMUSCULAR; INTRAVENOUS EVERY 4 HOURS PRN
Status: DISCONTINUED | OUTPATIENT
Start: 2023-01-06 | End: 2023-01-06 | Stop reason: HOSPADM

## 2023-01-06 RX ORDER — SUCCINYLCHOLINE/SOD CL,ISO/PF 100 MG/5ML
SYRINGE (ML) INTRAVENOUS AS NEEDED
Status: DISCONTINUED | OUTPATIENT
Start: 2023-01-06 | End: 2023-01-06

## 2023-01-06 RX ADMIN — DEXAMETHASONE SODIUM PHOSPHATE 10 MG: 10 INJECTION, SOLUTION INTRAMUSCULAR; INTRAVENOUS at 21:56

## 2023-01-06 RX ADMIN — ONDANSETRON 4 MG: 2 INJECTION INTRAMUSCULAR; INTRAVENOUS at 21:56

## 2023-01-06 RX ADMIN — Medication 180 MG: at 21:53

## 2023-01-06 RX ADMIN — SODIUM CHLORIDE, SODIUM LACTATE, POTASSIUM CHLORIDE, AND CALCIUM CHLORIDE: .6; .31; .03; .02 INJECTION, SOLUTION INTRAVENOUS at 21:47

## 2023-01-06 RX ADMIN — LIDOCAINE HYDROCHLORIDE 50 MG: 20 INJECTION INTRAVENOUS at 21:53

## 2023-01-06 RX ADMIN — PROPOFOL 280 MG: 10 INJECTION, EMULSION INTRAVENOUS at 21:53

## 2023-01-07 LAB
ATRIAL RATE: 97 BPM
P AXIS: 73 DEGREES
PR INTERVAL: 160 MS
QRS AXIS: -17 DEGREES
QRSD INTERVAL: 80 MS
QT INTERVAL: 348 MS
QTC INTERVAL: 441 MS
T WAVE AXIS: 57 DEGREES
VENTRICULAR RATE: 97 BPM

## 2023-01-07 NOTE — ANESTHESIA POSTPROCEDURE EVALUATION
Post-Op Assessment Note    CV Status:  Stable    Pain management: adequate     Mental Status:  Alert and awake   Hydration Status:  Euvolemic   PONV Controlled:  Controlled   Airway Patency:  Patent      Post Op Vitals Reviewed: Yes      Staff: Anesthesiologist, CRNA         No notable events documented      BP   165/46   Temp 98   Pulse 67   Resp 15   SpO2 100

## 2023-01-07 NOTE — ANESTHESIA PREPROCEDURE EVALUATION
Procedure:  EGD  Chicken dinner at 1800  Relevant Problems   No relevant active problems        Physical Exam    Airway    Mallampati score: II  TM Distance: >3 FB  Neck ROM: full     Dental       Cardiovascular      Pulmonary      Other Findings        Anesthesia Plan  ASA Score- 3     Anesthesia Type- general with ASA Monitors  Additional Monitors:   Airway Plan: ETT  Plan Factors-Exercise tolerance (METS): >4 METS  Chart reviewed  Patient is not a current smoker  Induction- intravenous  Postoperative Plan-     Informed Consent- Anesthetic plan and risks discussed with patient  I personally reviewed this patient with the CRNA  Discussed and agreed on the Anesthesia Plan with the MARYANNE Dye

## 2023-01-07 NOTE — INTERVAL H&P NOTE
H&P reviewed  After examining the patient I find no changes in the patients condition since the H&P had been written      Vitals:    01/06/23 2139   BP: 136/65   Pulse: 69   Resp: 18   Temp: 97 9 °F (36 6 °C)   SpO2: 99%

## 2023-01-07 NOTE — ED TRIAGE NOTES
Pt said "you wont be able to get my blood work" pt stuck twice for blood work and refused any further test  Pt has had to be scoped each time this occurred

## 2023-01-07 NOTE — ED NOTES
Pt evaluated by ED physician and transferred to OR prior to nursing assessment       Mandie Herbert RN  01/06/23 4312

## 2023-01-07 NOTE — CONSULTS
Consultation - [address] [facility]     Kalyn Menendez 59 y o  female MRN: [id number]  Unit/Bed#: ED 02 Encounter: [id number]    Consults    ASSESSMENT and PLAN    Esophageal food bolus  57F with history of dysphagia and multiple prior esophageal food bolus impactions dating back to , presenting with esophageal food bolus that occurred after eating chicken at 6 PM tonight  Unable to tolerate secretions  Plan stat upper endoscopy with intubation for airway protection    Chief Complaint   Patient presents with   • Foreign Body in Throat     Pt said she was eating chicken around 6pm  Pt said she thinks a piece is still stuck in her throat  Pt denies sob  Pt says this has happen in the past         Physician Requesting Consult: DO MIKE Rodriguez  Kalyn Menendez is a 59y o  year old female presented to the ER with esophageal food bolus sensation  She is known to our practice with multiple prior presentations  Symptoms began in   She had emergency EGDs in 2019, 2020, 2022, 2022  She is never followed up in the office or for elective EGDs for dilatation  She has recently been hoping with her sick mother  She denies weight loss or other alarm symptoms  Historical Information   Past Medical History:   Diagnosis Date   • Esophageal obstruction due to food impaction      Past Surgical History:   Procedure Laterality Date   •  SECTION     • GALLBLADDER SURGERY     • UPPER GASTROINTESTINAL ENDOSCOPY       Social History   Social History     Substance and Sexual Activity   Alcohol Use Never     Social History     Substance and Sexual Activity   Drug Use Never     Social History     Tobacco Use   Smoking Status Never   Smokeless Tobacco Never     Family History   Problem Relation Age of Onset   • Colon cancer Neg Hx    • Colon polyps Neg Hx        Meds/Allergies     No current facility-administered medications for this encounter       (Not in a hospital admission)      No Known Allergies    PHYSICAL EXAM    Blood pressure 136/65, pulse 69, temperature 97 9 °F (36 6 °C), temperature source Temporal, resp  rate 18, SpO2 99 %  There is no height or weight on file to calculate BMI  General Appearance: NAD, cooperative, alert  Eyes: Anicteric, PERRLA, EOMI  ENT:  Normocephalic, atraumatic, normal mucosa  Neck:  Supple, symmetrical, trachea midline  Resp:  Clear to auscultation bilaterally; no rales, rhonchi or wheezing; respirations unlabored   CV:  S1 S2, Regular rate and rhythm; no murmur, rub, or gallop  GI:  Soft, non-tender, non-distended; normal bowel sounds; no masses, no organomegaly   Rectal: Deferred  Musculoskeletal: No cyanosis, clubbing or edema  Normal ROM  Skin:  No jaundice, rashes, or lesions   Heme/Lymph: No palpable cervical lymphadenopathy  Psych: Normal affect, good eye contact  Neuro: No gross deficits, AAOx3    Lab Results   Component Value Date    CALCIUM 9 2 08/21/2019    K 3 4 (L) 08/21/2019    CO2 25 08/21/2019     08/21/2019    BUN 17 08/21/2019    CREATININE 0 77 08/21/2019     Lab Results   Component Value Date    WBC 9 80 08/21/2019    HGB 14 9 08/21/2019    HCT 45 9 08/21/2019    MCV 88 08/21/2019     08/21/2019     Lab Results   Component Value Date    ALT 26 08/21/2019    AST 22 08/21/2019    ALKPHOS 102 08/21/2019     No results found for: AMYLASE  No results found for: LIPASE  No results found for: IRON, TIBC, FERRITIN  No results found for: INR    Imaging Studies: I have personally reviewed pertinent reports  EKG, Pathology, and Other Studies: I have personally reviewed pertinent films in PACS    REVIEW OF SYSTEMS:    CONSTITUTIONAL: Denies any fever, chills, rigors, and weight loss  HEENT: No earache or tinnitus  Denies hearing loss or visual disturbances  CARDIOVASCULAR: No chest pain or palpitations     RESPIRATORY: Denies any cough, hemoptysis, shortness of breath or dyspnea on exertion  GASTROINTESTINAL: As noted in the History of Present Illness  GENITOURINARY: No problems with urination  Denies any hematuria or dysuria  NEUROLOGIC: No dizziness or vertigo, denies headaches  MUSCULOSKELETAL: Denies any muscle or joint pain  SKIN: Denies skin rashes or itching  ENDOCRINE: Denies excessive thirst  Denies intolerance to heat or cold  PSYCHOSOCIAL: Denies depression or anxiety  Denies any recent memory loss

## 2023-01-07 NOTE — ED PROVIDER NOTES
History  Chief Complaint   Patient presents with   • Foreign Body in Throat     Pt said she was eating chicken around 6pm  Pt said she thinks a piece is still stuck in her throat  Pt denies sob  Pt says this has happen in the past       22-year-old female with prior history of esophageal foreign body presents with inability to swallow after eating chicken at 6 PM this afternoon  Has had glucagon several times in the past without success      History provided by:  Patient  Medical Problem  Location:  Esophageal  Quality:  Foreign body  Severity:  Severe  Onset quality:  Sudden  Duration:  2 hours  Timing:  Constant  Progression:  Unchanged  Chronicity:  Recurrent  Context:  Esophageal foreign body after eating chicken      Prior to Admission Medications   Prescriptions Last Dose Informant Patient Reported? Taking? pantoprazole (PROTONIX) 40 mg tablet Not Taking  No No   Sig: Take 1 tablet (40 mg total) by mouth 2 (two) times a day before meals   Patient not taking: Reported on 2023      Facility-Administered Medications: None       Past Medical History:   Diagnosis Date   • Esophageal obstruction due to food impaction        Past Surgical History:   Procedure Laterality Date   •  SECTION     • GALLBLADDER SURGERY     • UPPER GASTROINTESTINAL ENDOSCOPY         Family History   Problem Relation Age of Onset   • Colon cancer Neg Hx    • Colon polyps Neg Hx      I have reviewed and agree with the history as documented      E-Cigarette/Vaping   • E-Cigarette Use Never User      E-Cigarette/Vaping Substances   • Nicotine No    • THC No    • CBD No    • Flavoring No    • Other No    • Unknown No      Social History     Tobacco Use   • Smoking status: Never   • Smokeless tobacco: Never   Vaping Use   • Vaping Use: Never used   Substance Use Topics   • Alcohol use: Never   • Drug use: Never       Review of Systems   Gastrointestinal:        Trouble swallowing with esophageal foreign body   All other systems reviewed and are negative  Physical Exam  Physical Exam  Vitals and nursing note reviewed  Constitutional:       General: She is in acute distress  HENT:      Head: Normocephalic and atraumatic  Right Ear: Tympanic membrane, ear canal and external ear normal       Left Ear: Tympanic membrane, ear canal and external ear normal       Nose: Nose normal    Eyes:      General: No scleral icterus  Right eye: No discharge  Left eye: No discharge  Extraocular Movements: Extraocular movements intact  Pupils: Pupils are equal, round, and reactive to light  Cardiovascular:      Rate and Rhythm: Normal rate and regular rhythm  Pulses: Normal pulses  Heart sounds: No murmur heard  No friction rub  No gallop  Pulmonary:      Effort: Pulmonary effort is normal  No respiratory distress  Breath sounds: No stridor  No wheezing, rhonchi or rales  Abdominal:      Palpations: Abdomen is soft  Tenderness: There is no abdominal tenderness  There is no guarding or rebound  Musculoskeletal:         General: No swelling, tenderness, deformity or signs of injury  Normal range of motion  Cervical back: Normal range of motion and neck supple  No rigidity or tenderness  Right lower leg: No edema  Left lower leg: No edema  Skin:     General: Skin is warm  Coloration: Skin is not jaundiced  Findings: No bruising, erythema or rash  Neurological:      General: No focal deficit present  Mental Status: She is alert and oriented to person, place, and time  Cranial Nerves: No cranial nerve deficit  Motor: No weakness        Gait: Gait normal    Psychiatric:         Mood and Affect: Mood normal          Behavior: Behavior normal          Vital Signs  ED Triage Vitals [01/06/23 1944]   Temperature Pulse Respirations Blood Pressure SpO2   98 2 °F (36 8 °C) 96 18 124/82 100 %      Temp src Heart Rate Source Patient Position - Orthostatic VS BP Location FiO2 (%)   -- -- -- -- --      Pain Score       --           Vitals:    01/06/23 1944   BP: 124/82   Pulse: 96         Visual Acuity      ED Medications  Medications - No data to display    Diagnostic Studies  Results Reviewed     Procedure Component Value Units Date/Time    CBC and differential [[id number]]     Lab Status: No result Specimen: Blood     Comprehensive metabolic panel [[id number]]     Lab Status: No result Specimen: Blood     HS Troponin 0hr (reflex protocol) [[id number]]     Lab Status: No result Specimen: Blood                  XR chest 2 views   ED Interpretation by Nolan Ayala DO (01/06 2044)   No acute infiltrate or pneumothorax                 Procedures  Procedures         ED Course                                             Medical Decision Making  Recurrent esophageal foreign body will need endoscopy for removal    Esophageal obstruction due to food impaction: acute illness or injury  Amount and/or Complexity of Data Reviewed  Labs: ordered  Radiology: ordered and independent interpretation performed  Disposition  Final diagnoses:   Esophageal obstruction due to food impaction     Time reflects when diagnosis was documented in both MDM as applicable and the Disposition within this note     Time User Action Codes Description Comment    1/6/2023  8:26 PM Maritza Green Add [K22 2,  X95 [id number]] Esophageal obstruction due to food impaction       ED Disposition     ED Disposition   Send to OR    Condition   --    Date/Time   Fri Jan 6, 2023  9:21 PM    Comment   Patient to the OR for endoscopy and foreign body removal         Follow-up Information    None         Patient's Medications   Discharge Prescriptions    No medications on file       No discharge procedures on file      PDMP Review     None          ED Provider  Electronically Signed by           Nolan Ayala DO  01/06/23 2868

## 2023-01-07 NOTE — H&P (VIEW-ONLY)
Consultation - [address] [facility]     Melody Wise 59 y o  female MRN: [id number]  Unit/Bed#: ED 02 Encounter: [id number]    Consults    ASSESSMENT and PLAN    Esophageal food bolus  57F with history of dysphagia and multiple prior esophageal food bolus impactions dating back to , presenting with esophageal food bolus that occurred after eating chicken at 6 PM tonight  Unable to tolerate secretions  Plan stat upper endoscopy with intubation for airway protection    Chief Complaint   Patient presents with   • Foreign Body in Throat     Pt said she was eating chicken around 6pm  Pt said she thinks a piece is still stuck in her throat  Pt denies sob  Pt says this has happen in the past         Physician Requesting Consult: Analia Laurent DO    HPI  Melody Wise is a 59y o  year old female presented to the ER with esophageal food bolus sensation  She is known to our practice with multiple prior presentations  Symptoms began in   She had emergency EGDs in 2019, 2020, 2022, 2022  She is never followed up in the office or for elective EGDs for dilatation  She has recently been hoping with her sick mother  She denies weight loss or other alarm symptoms  Historical Information   Past Medical History:   Diagnosis Date   • Esophageal obstruction due to food impaction      Past Surgical History:   Procedure Laterality Date   •  SECTION     • GALLBLADDER SURGERY     • UPPER GASTROINTESTINAL ENDOSCOPY       Social History   Social History     Substance and Sexual Activity   Alcohol Use Never     Social History     Substance and Sexual Activity   Drug Use Never     Social History     Tobacco Use   Smoking Status Never   Smokeless Tobacco Never     Family History   Problem Relation Age of Onset   • Colon cancer Neg Hx    • Colon polyps Neg Hx        Meds/Allergies     No current facility-administered medications for this encounter       (Not in a hospital admission)      No Known Allergies    PHYSICAL EXAM    Blood pressure 136/65, pulse 69, temperature 97 9 °F (36 6 °C), temperature source Temporal, resp  rate 18, SpO2 99 %  There is no height or weight on file to calculate BMI  General Appearance: NAD, cooperative, alert  Eyes: Anicteric, PERRLA, EOMI  ENT:  Normocephalic, atraumatic, normal mucosa  Neck:  Supple, symmetrical, trachea midline  Resp:  Clear to auscultation bilaterally; no rales, rhonchi or wheezing; respirations unlabored   CV:  S1 S2, Regular rate and rhythm; no murmur, rub, or gallop  GI:  Soft, non-tender, non-distended; normal bowel sounds; no masses, no organomegaly   Rectal: Deferred  Musculoskeletal: No cyanosis, clubbing or edema  Normal ROM  Skin:  No jaundice, rashes, or lesions   Heme/Lymph: No palpable cervical lymphadenopathy  Psych: Normal affect, good eye contact  Neuro: No gross deficits, AAOx3    Lab Results   Component Value Date    CALCIUM 9 2 08/21/2019    K 3 4 (L) 08/21/2019    CO2 25 08/21/2019     08/21/2019    BUN 17 08/21/2019    CREATININE 0 77 08/21/2019     Lab Results   Component Value Date    WBC 9 80 08/21/2019    HGB 14 9 08/21/2019    HCT 45 9 08/21/2019    MCV 88 08/21/2019     08/21/2019     Lab Results   Component Value Date    ALT 26 08/21/2019    AST 22 08/21/2019    ALKPHOS 102 08/21/2019     No results found for: AMYLASE  No results found for: LIPASE  No results found for: IRON, TIBC, FERRITIN  No results found for: INR    Imaging Studies: I have personally reviewed pertinent reports  EKG, Pathology, and Other Studies: I have personally reviewed pertinent films in PACS    REVIEW OF SYSTEMS:    CONSTITUTIONAL: Denies any fever, chills, rigors, and weight loss  HEENT: No earache or tinnitus  Denies hearing loss or visual disturbances  CARDIOVASCULAR: No chest pain or palpitations     RESPIRATORY: Denies any cough, hemoptysis, shortness of breath or dyspnea on exertion  GASTROINTESTINAL: As noted in the History of Present Illness  GENITOURINARY: No problems with urination  Denies any hematuria or dysuria  NEUROLOGIC: No dizziness or vertigo, denies headaches  MUSCULOSKELETAL: Denies any muscle or joint pain  SKIN: Denies skin rashes or itching  ENDOCRINE: Denies excessive thirst  Denies intolerance to heat or cold  PSYCHOSOCIAL: Denies depression or anxiety  Denies any recent memory loss

## 2023-01-11 ENCOUNTER — TELEPHONE (OUTPATIENT)
Dept: GASTROENTEROLOGY | Facility: CLINIC | Age: 58
End: 2023-01-11

## 2023-01-11 DIAGNOSIS — B96.81 H PYLORI ULCER: Primary | ICD-10-CM

## 2023-01-11 DIAGNOSIS — K27.9 H PYLORI ULCER: Primary | ICD-10-CM

## 2023-01-11 RX ORDER — BISMUTH SUBSALICYLATE 262 MG/1
524 TABLET, CHEWABLE ORAL
Qty: 112 TABLET | Refills: 0 | Status: SHIPPED | OUTPATIENT
Start: 2023-01-11 | End: 2023-01-25

## 2023-01-11 RX ORDER — METRONIDAZOLE 500 MG/1
500 TABLET ORAL 3 TIMES DAILY
Qty: 42 TABLET | Refills: 0 | Status: SHIPPED | OUTPATIENT
Start: 2023-01-11 | End: 2023-01-25

## 2023-01-11 RX ORDER — DOXYCYCLINE HYCLATE 100 MG/1
100 CAPSULE ORAL EVERY 12 HOURS SCHEDULED
Qty: 28 CAPSULE | Refills: 0 | Status: SHIPPED | OUTPATIENT
Start: 2023-01-11 | End: 2023-01-25

## 2023-01-11 NOTE — RESULT ENCOUNTER NOTE
Discussed with patient, see phone encounter, prescribed H  pylori therapy    We will arrange EGD at [facility] for dilatation of strictures

## 2023-01-11 NOTE — TELEPHONE ENCOUNTER
Called patient with gastric biopsies, positive for H  pylori  She is already taking a PPI  Prescribed the remainder of quadruple therapy    Biopsy showed mild eosinophilia, she had a ringed appearance of the esophagus and has had multiple esophageal food boluses  Clinically consistent with eosinophilic esophagitis    Continue PPI  Please schedule EGD with dilatation at the [facility] center, knows me, Dr Agarwal, and Dr Pedro Luis Vega  Due to frequent food boluses, recommend EGD in 2 to 4 weeks      Discussed that pending EGD results, she may benefit from 6 food elimination diet, budesonide, or Dupixent for eosinophilic esophagitis

## 2023-01-12 NOTE — TELEPHONE ENCOUNTER
Scheduled date of EGD(as of today):01/31/2023  Physician performing EGD:Dr Tyra Arias  Location of EGD:[facility]  Instructions emailed to patient by: Hayley Galvez

## 2023-01-12 NOTE — TELEPHONE ENCOUNTER
01/12/23  Screened by: Abril Paulson    Referring Provider     Pre- Screening: There is no height or weight on file to calculate BMI  Has patient been referred for a routine screening Colonoscopy? no  Is the patient between 39-70 years old? yes      Previous Colonoscopy no   If yes:    Date:     Facility:     Reason:       SCHEDULING STAFF: If the patient is between 45yrs-49yrs, please advise patient to confirm benefits/coverage with their insurance company for a routine screening colonoscopy, some insurance carriers will only cover at [address] or older  If the patient is over 66years old, please schedule an office visit  Does the patient want to see a Gastroenterologist prior to their procedure OR are they having any GI symptoms? no    Has the patient been hospitalized or had abdominal surgery in the past 6 months? yes  Seen ED for esophageal obstruction due to food impaction  Does the patient use supplemental oxygen? no    Does the patient take Coumadin, Lovenox, Plavix, Elliquis, Xarelto, or other blood thinning medication? no    Has the patient had a stroke, cardiac event, or stent placed in the past year? no    SCHEDULING STAFF: If patient answers NO to above questions, then schedule procedure  If patient answers YES to above questions, then schedule office appointment  If patient is between 45yrs - 49yrs, please advise patient that we will have to confirm benefits & coverage with their insurance company for a routine screening colonoscopy

## 2023-01-13 NOTE — UTILIZATION REVIEW
NOTIFICATION OF EMERGENT OUTPATIENT PROCEDURE   AUTHORIZATION REQUEST   SERVICING FACILITY:   [facility]  [address] [place] [id number]  Tax ID: [id number]  NPI: [id number] ATTENDING PROVIDER:  Attending Name and NPI#:   Address: [address] [place] [address] [address] [address]  Phone: [phone]   ADMISSION INFORMATION:  Place of Service: On [address] [place] Code: 22 CPT Code:   Patient presented to the ED and had an outpatient procedure     OUTPATIENT PROCEDURE INFORMATION  Surgery Date: * No surgery found *  Discharge Date/Time: 1/6/2023 11:14 PM  Patient Preop Diagnosis:   CPT [address]      UTILIZATION REVIEW CONTACT:  Lilli Gonsalves Utilization   Network Utilization Review Department  Phone: [phone]  Fax [phone]  Email: [email or web address]  Contact for approvals/pending authorizations, clinical reviews, and discharge  PHYSICIAN ADVISORY SERVICES:  Medical Necessity Denial & [employer] Review  Phone: [phone]  Fax: [phone]  Email: [email or web address]

## 2023-01-16 NOTE — TELEPHONE ENCOUNTER
Spoke to patient  Confirmed EGD arrival time 10:00 am 1/31/2023 [facility]  She requested prep instructions be emailed to her  Instructions sent via email

## 2023-01-31 ENCOUNTER — ANESTHESIA EVENT (OUTPATIENT)
Dept: GASTROENTEROLOGY | Facility: AMBULATORY SURGERY CENTER | Age: 58
End: 2023-01-31

## 2023-01-31 ENCOUNTER — ANESTHESIA (OUTPATIENT)
Dept: GASTROENTEROLOGY | Facility: AMBULATORY SURGERY CENTER | Age: 58
End: 2023-01-31

## 2023-01-31 ENCOUNTER — HOSPITAL ENCOUNTER (OUTPATIENT)
Dept: GASTROENTEROLOGY | Facility: AMBULATORY SURGERY CENTER | Age: 58
Discharge: HOME | End: 2023-01-31
Attending: INTERNAL MEDICINE

## 2023-01-31 VITALS
HEART RATE: 69 BPM | BODY MASS INDEX: 40.18 KG/M2 | DIASTOLIC BLOOD PRESSURE: 84 MMHG | RESPIRATION RATE: 15 BRPM | SYSTOLIC BLOOD PRESSURE: 128 MMHG | HEIGHT: 66 IN | TEMPERATURE: 98 F | WEIGHT: 250 LBS | OXYGEN SATURATION: 100 %

## 2023-01-31 DIAGNOSIS — K56.699 FOOD BOLUS OBSTRUCTION OF INTESTINE (HCC): ICD-10-CM

## 2023-01-31 RX ORDER — PROPOFOL 10 MG/ML
INJECTION, EMULSION INTRAVENOUS AS NEEDED
Status: DISCONTINUED | OUTPATIENT
Start: 2023-01-31 | End: 2023-01-31

## 2023-01-31 RX ORDER — SODIUM CHLORIDE, SODIUM LACTATE, POTASSIUM CHLORIDE, CALCIUM CHLORIDE 600; 310; 30; 20 MG/100ML; MG/100ML; MG/100ML; MG/100ML
50 INJECTION, SOLUTION INTRAVENOUS CONTINUOUS
Status: DISCONTINUED | OUTPATIENT
Start: 2023-01-31 | End: 2023-01-31

## 2023-01-31 RX ADMIN — SODIUM CHLORIDE, SODIUM LACTATE, POTASSIUM CHLORIDE, CALCIUM CHLORIDE 50 ML/HR: 600; 310; 30; 20 INJECTION, SOLUTION INTRAVENOUS at 10:44

## 2023-01-31 RX ADMIN — PROPOFOL 150 MG: 10 INJECTION, EMULSION INTRAVENOUS at 11:10

## 2023-01-31 RX ADMIN — PROPOFOL 50 MG: 10 INJECTION, EMULSION INTRAVENOUS at 11:14

## 2023-01-31 NOTE — ANESTHESIA PREPROCEDURE EVALUATION
Procedure:  EGD    Relevant Problems   ANESTHESIA (within normal limits)   (-) History of anesthesia complications      CARDIO   (-) Chest pain   (-) GARCÍA (dyspnea on exertion)      NEURO/PSYCH   (+) Anxiety      PULMONARY   (-) Shortness of breath   (-) URI (upper respiratory infection)      Other   (+) Obesity        Physical Exam    Airway    Mallampati score: II  TM Distance: >3 FB  Neck ROM: full     Dental       Cardiovascular      Pulmonary      Other Findings        Anesthesia Plan  ASA Score- 2     Anesthesia Type- IV sedation with anesthesia with ASA Monitors  Additional Monitors:   Airway Plan:           Plan Factors-Exercise tolerance (METS): >4 METS  Chart reviewed  EKG reviewed  Existing labs reviewed  Patient summary reviewed  Induction- intravenous  Postoperative Plan-     Informed Consent- Anesthetic plan and risks discussed with patient  I personally reviewed this patient with the CRNA  Discussed and agreed on the Anesthesia Plan with the CRNA  Jamila Mcpherson

## 2023-01-31 NOTE — H&P
History and Physical - [facility]    Camila Holly 59 y o  female MRN: [id number]      HPI: Camila Holly is a 59y o  year old female who presents for dysphagia, EOE, mult food impactions  No Known Allergies      REVIEW OF SYSTEMS: Per the HPI, and otherwise unremarkable  Historical Information     Past Medical History:   Diagnosis Date   • Esophageal obstruction due to food impaction    • GERD (gastroesophageal reflux disease)      Past Surgical History:   Procedure Laterality Date   •  SECTION     • CHOLECYSTECTOMY     • GALLBLADDER SURGERY     • TUBAL LIGATION     • UPPER GASTROINTESTINAL ENDOSCOPY       Social History   Social History     Substance and Sexual Activity   Alcohol Use Never     Social History     Substance and Sexual Activity   Drug Use Never     Social History     Tobacco Use   Smoking Status Never   Smokeless Tobacco Never     Family History   Problem Relation Age of Onset   • Colon cancer Neg Hx    • Colon polyps Neg Hx        Meds/Allergies       Current Outpatient Medications:   •  pantoprazole (PROTONIX) 40 mg tablet    Current Facility-Administered Medications:   •  lactated ringers infusion, 50 mL/hr, Intravenous, Continuous, 50 mL/hr at 23 1044        Objective     /58   Pulse 71   Temp 98 °F (36 7 °C) (Temporal)   Resp 16   Ht 5' 6" (1 676 m)   Wt 113 kg (250 lb)   SpO2 100%   BMI 40 35 kg/m²       PHYSICAL EXAM    Gen: NAD AAOx3  Head: Normocephalic, Atraumatic  CV: S1S2 RRR no m/r/g  CHEST: Clear b/l no c/r/w  ABD: soft, +BS NT/ND no masses  EXT: no edema      ASSESSMENT/PLAN:  This is a 59y o  year old female here for EGD/DILATION, and she is stable and optimized for her procedure

## 2023-02-02 ENCOUNTER — TELEPHONE (OUTPATIENT)
Dept: GASTROENTEROLOGY | Facility: CLINIC | Age: 58
End: 2023-02-02

## 2023-02-02 NOTE — TELEPHONE ENCOUNTER
Left a voice mail for the patient to call back and schedule her EGD 4 to 6 week follow up with Kevin Osman on March 20th at 3:30pm

## 2023-02-08 NOTE — RESULT ENCOUNTER NOTE
RECALL none  Pt already has f/u in the [facility] to further discuss EOE  Called pt, no answer, left VM  Pt is s/p dilation  Left VM asking pt to give us an udpate RE her dysphagia on PPI and s/p dilation  If doing ok, [facility]  [facility] - will need to discuss if she needs a course of budesonide slurry vs 6 food elimination diet for EOE

## 2023-03-20 ENCOUNTER — OFFICE VISIT (OUTPATIENT)
Dept: GASTROENTEROLOGY | Facility: CLINIC | Age: 58
End: 2023-03-20

## 2023-03-20 VITALS
BODY MASS INDEX: 43.94 KG/M2 | HEIGHT: 66 IN | DIASTOLIC BLOOD PRESSURE: 72 MMHG | WEIGHT: 273.4 LBS | SYSTOLIC BLOOD PRESSURE: 122 MMHG

## 2023-03-20 DIAGNOSIS — K22.2 ESOPHAGEAL OBSTRUCTION DUE TO FOOD IMPACTION: ICD-10-CM

## 2023-03-20 DIAGNOSIS — T18.128A ESOPHAGEAL OBSTRUCTION DUE TO FOOD IMPACTION: ICD-10-CM

## 2023-03-20 DIAGNOSIS — A04.8 BACTERIAL INFECTION DUE TO H. PYLORI: ICD-10-CM

## 2023-03-20 DIAGNOSIS — K25.3 ACUTE GASTRIC ULCER WITHOUT HEMORRHAGE OR PERFORATION: Primary | ICD-10-CM

## 2023-03-20 DIAGNOSIS — K20.0 EOSINOPHILIC ESOPHAGITIS: ICD-10-CM

## 2023-03-20 NOTE — PROGRESS NOTES
[facility] - Outpatient Follow-up Note  Lisbeth Walsh 59 y o  female MRN: [id number]  Encounter: [id number]    ASSESSMENT AND PLAN:      1  Acute gastric ulcer without hemorrhage or perforation  2  Bacterial infection due to H  pylori  Surveillance EGD 1/31/2023 shows persistent large cratered gastric ulcer  Positive for H  pylori on EGD 1/6/2023  Patient did not tolerate quadruple therapy and discontinued after 2 to 3 days  Denies reflux/GERD symptoms  No melena or rectal bleeding  Prescribe PPI twice daily but admits to noncompliance  -Would retreat H  pylori with triple therapy and avoid use of metronidazole  -Plan to check stool for H  pylori 3 months after treatment completed  -Decrease pantoprazole to 40 mg daily  Emphasized importance of daily dosing  -Reviewed GERD diet and lifestyle modification  -Scheduled for surveillance EGD at [facility]    3  Eosinophilic esophagitis  Patient with history of recurrent food bolus since 2019 requiring multiple urgent EGDs  Elective EGD and esophageal dilation 1/31/2023-found to have moderate scarred mucosa with concentric rings throughout the esophagus  Biopsies positive for EOE  Denies dysphagia, no recurrent acute food boluses since dilation  Denies reflux symptoms  -Recommend elimination diet for EOE nonresponsive to PPI  Discussed with patient  however she denies food allergies and does not wish to eliminate eggs, nuts or dairy from her diet  -Continue pantoprazole 40 mg daily  -Consider budesonide slurry if persistent EOE on surveillance EGD          Followup Appointment: 3-4 months  ______________________________________________________________________    Chief Complaint   Patient presents with   • follow up EGD     HPI: Presents today in follow-up for history of dysphagia/food bolus, esophagitis and gastric ulcer  Symptoms began in 2019      She underwent emergency EGDs for food bolus extraction in August 2019, December , 2022, 2022 and 2023  Never followed up in office for elective EGD with dilation    EGD during hospitalization 2023 revealed severe abnormal mucosa with concentric rings in the esophagus and single superficial gastric ulcer with clean base  She was treated with PPI twice daily   Biopsies were positive for H  pylori and she was prescribed quadruple therapy which she did not complete due to intolerance of metronidazole  Took regimen for 2 to 3 days    Elective EGD performed 2023 and revealed moderately scarred esophageal mucosa with concentric rings and single large cratered gastric ulcer  Esophagus was dilated empirically  Biopsies at that time were positive for eosinophilic esophagitis    She continues to take pantoprazole but admits to noncompliance with twice daily dosing at times  She denies any further dysphagia or food boluses  Denies reflux/GERD symptoms or liquid reflux  No epigastric pain, nausea or vomiting  Appetite is good  She has lost approximately 35 pounds intentionally since January with exercise, portion control and avoiding carbohydrate    No recent acute change in bowel habits-reports formed bowel movement daily    Denies melena or rectal bleeding  No prior screening colonoscopy                Historical Information   Past Medical History:   Diagnosis Date   • Esophageal obstruction due to food impaction    • GERD (gastroesophageal reflux disease)      Past Surgical History:   Procedure Laterality Date   •  SECTION     • CHOLECYSTECTOMY     • GALLBLADDER SURGERY     • TUBAL LIGATION     • UPPER GASTROINTESTINAL ENDOSCOPY       Social History     Substance and Sexual Activity   Alcohol Use Never     Social History     Substance and Sexual Activity   Drug Use Never     Social History     Tobacco Use   Smoking Status Never   Smokeless Tobacco Never     Family History   Problem Relation Age of Onset   • Colon cancer Neg Hx    • Colon polyps Neg Hx          Current Outpatient Medications:   •  pantoprazole (PROTONIX) 40 mg tablet  No Known Allergies  Reviewed medications and allergies and updated as indicated    PHYSICAL EXAM:    Blood pressure 122/72, height 5' 6" (1 676 m), weight 124 kg (273 lb 6 4 oz)  Body mass index is 44 13 kg/m²  General Appearance: NAD, cooperative, alert  Eyes: Anicteric  ENT:  Normocephalic, atraumatic, normal mucosa  Neck:  Supple, symmetrical, trachea midline  Resp:  Clear to auscultation bilaterally; no rales, rhonchi or wheezing; respirations unlabored   CV:  S1 S2, Regular rate and rhythm; no murmur, rub, or gallop  GI:  Soft, non-tender, non-distended; normal bowel sounds; no masses, no organomegaly   Rectal: Deferred  Musculoskeletal: No cyanosis, clubbing or edema  Normal ROM    Skin:  No jaundice, rashes, or lesions   Psych: Normal affect, good eye contact  Neuro: No gross deficits, AAOx3    Lab Results:   Lab Results   Component Value Date    WBC 9 80 08/21/2019    HGB 14 9 08/21/2019    HCT 45 9 08/21/2019    MCV 88 08/21/2019     08/21/2019     Lab Results   Component Value Date    K 3 4 (L) 08/21/2019     08/21/2019    CO2 25 08/21/2019    BUN 17 08/21/2019    CREATININE 0 77 08/21/2019    CALCIUM 9 2 08/21/2019    AST 22 08/21/2019    ALT 26 08/21/2019    ALKPHOS 102 08/21/2019    EGFR 87 08/21/2019

## 2023-03-20 NOTE — LETTER
March 21, 2023     [address]  [address]  [facility]    Patient: Dasha Duran   YOB: 1965   Date of Visit: 3/20/2023       Dear Dr Farideh Ayoub: Thank you for referring Malina Saez to me for evaluation  Below are my notes for this consultation  If you have questions, please do not hesitate to call me  I look forward to following your patient along with you  Sincerely,        LEANNA Manzo        CC: No Recipients  LEANNA Manzo  3/21/2023  4:54 PM  Sign when Signing Visit  [facility] - Outpatient Follow-up Note  Dasha Duran 59 y o  female MRN: [id number]  Encounter: [id number]    ASSESSMENT AND PLAN:      1  Acute gastric ulcer without hemorrhage or perforation  2  Bacterial infection due to H  pylori  Surveillance EGD 1/31/2023 shows persistent large cratered gastric ulcer  Positive for H  pylori on EGD 1/6/2023  Patient did not tolerate quadruple therapy and discontinued after 2 to 3 days  Denies reflux/GERD symptoms  No melena or rectal bleeding  Prescribe PPI twice daily but admits to noncompliance  -Would retreat H  pylori with triple therapy and avoid use of metronidazole  -Plan to check stool for H  pylori 3 months after treatment completed  -Decrease pantoprazole to 40 mg daily  Emphasized importance of daily dosing  -Reviewed GERD diet and lifestyle modification  -Scheduled for surveillance EGD at [facility] center    3  Eosinophilic esophagitis  Patient with history of recurrent food bolus since 2019 requiring multiple urgent EGDs  Elective EGD and esophageal dilation 1/31/2023-found to have moderate scarred mucosa with concentric rings throughout the esophagus  Biopsies positive for EOE  Denies dysphagia, no recurrent acute food boluses since dilation  Denies reflux symptoms  -Recommend elimination diet for EOE nonresponsive to PPI   Discussed with patient  however she denies food allergies and does not wish to eliminate eggs, nuts or dairy from her diet  -Continue pantoprazole 40 mg daily  -Consider budesonide slurry if persistent EOE on surveillance EGD          Followup Appointment: 3-4 months  ______________________________________________________________________    Chief Complaint   Patient presents with   • follow up EGD     HPI: Presents today in follow-up for history of dysphagia/food bolus, esophagitis and gastric ulcer  Symptoms began in 2019  She underwent emergency EGDs for food bolus extraction in August 2019, December 2020, August 2022, November 2022 and January 2023  Never followed up in office for elective EGD with dilation    EGD during hospitalization January 2023 revealed severe abnormal mucosa with concentric rings in the esophagus and single superficial gastric ulcer with clean base  She was treated with PPI twice daily   Biopsies were positive for H  pylori and she was prescribed quadruple therapy which she did not complete due to intolerance of metronidazole  Took regimen for 2 to 3 days    Elective EGD performed 1/31/2023 and revealed moderately scarred esophageal mucosa with concentric rings and single large cratered gastric ulcer  Esophagus was dilated empirically  Biopsies at that time were positive for eosinophilic esophagitis    She continues to take pantoprazole but admits to noncompliance with twice daily dosing at times  She denies any further dysphagia or food boluses  Denies reflux/GERD symptoms or liquid reflux  No epigastric pain, nausea or vomiting  Appetite is good  She has lost approximately 35 pounds intentionally since January with exercise, portion control and avoiding carbohydrate    No recent acute change in bowel habits-reports formed bowel movement daily    Denies melena or rectal bleeding  No prior screening colonoscopy                Historical Information   Past Medical History:   Diagnosis Date   • Esophageal obstruction due to food impaction    • GERD (gastroesophageal reflux disease)      Past Surgical History:   Procedure Laterality Date   •  SECTION     • CHOLECYSTECTOMY     • GALLBLADDER SURGERY     • TUBAL LIGATION     • UPPER GASTROINTESTINAL ENDOSCOPY       Social History     Substance and Sexual Activity   Alcohol Use Never     Social History     Substance and Sexual Activity   Drug Use Never     Social History     Tobacco Use   Smoking Status Never   Smokeless Tobacco Never     Family History   Problem Relation Age of Onset   • Colon cancer Neg Hx    • Colon polyps Neg Hx          Current Outpatient Medications:   •  pantoprazole (PROTONIX) 40 mg tablet  No Known Allergies  Reviewed medications and allergies and updated as indicated    PHYSICAL EXAM:    Blood pressure 122/72, height 5' 6" (1 676 m), weight 124 kg (273 lb 6 4 oz)  Body mass index is 44 13 kg/m²  General Appearance: NAD, cooperative, alert  Eyes: Anicteric  ENT:  Normocephalic, atraumatic, normal mucosa  Neck:  Supple, symmetrical, trachea midline  Resp:  Clear to auscultation bilaterally; no rales, rhonchi or wheezing; respirations unlabored   CV:  S1 S2, Regular rate and rhythm; no murmur, rub, or gallop  GI:  Soft, non-tender, non-distended; normal bowel sounds; no masses, no organomegaly   Rectal: Deferred  Musculoskeletal: No cyanosis, clubbing or edema  Normal ROM    Skin:  No jaundice, rashes, or lesions   Psych: Normal affect, good eye contact  Neuro: No gross deficits, AAOx3    Lab Results:   Lab Results   Component Value Date    WBC 9 80 2019    HGB 14 9 2019    HCT 45 9 2019    MCV 88 2019     2019     Lab Results   Component Value Date    K 3 4 (L) 2019     2019    CO2 25 2019    BUN 17 2019    CREATININE 0 77 2019    CALCIUM 9 2 2019    AST 22 2019    ALT 26 2019    ALKPHOS 102 2019    EGFR 87 2019

## 2023-03-21 ENCOUNTER — TELEPHONE (OUTPATIENT)
Dept: GASTROENTEROLOGY | Facility: CLINIC | Age: 58
End: 2023-03-21

## 2023-03-21 DIAGNOSIS — A04.8 BACTERIAL INFECTION DUE TO H. PYLORI: Primary | ICD-10-CM

## 2023-03-21 RX ORDER — AMOXICILLIN 500 MG/1
1000 CAPSULE ORAL EVERY 12 HOURS SCHEDULED
Qty: 56 CAPSULE | Refills: 0 | Status: SHIPPED | OUTPATIENT
Start: 2023-03-21 | End: 2023-04-04

## 2023-03-21 RX ORDER — CLARITHROMYCIN 500 MG/1
500 TABLET, COATED ORAL EVERY 12 HOURS SCHEDULED
Qty: 28 TABLET | Refills: 0 | Status: SHIPPED | OUTPATIENT
Start: 2023-03-21 | End: 2023-04-04

## 2023-03-23 ENCOUNTER — TELEPHONE (OUTPATIENT)
Dept: GASTROENTEROLOGY | Facility: CLINIC | Age: 58
End: 2023-03-23

## 2023-03-23 NOTE — TELEPHONE ENCOUNTER
----- Message from [address] sent at 3/21/2023  4:57 PM EDT -----  I saw this patient yesterday in the office  Can you please call her and tell her that Dr Abbi Craft wants her to have surveillance EGD mid to late April and get her scheduled for that procedure    Thanks so much  I did try calling her today but had to leave a voice message requesting her to call the office

## 2023-03-24 NOTE — TELEPHONE ENCOUNTER
Spoke to patient  She stated that she does not want to schedule at this time, but will call back to schedule EGD in the future

## 2023-05-18 ENCOUNTER — TELEPHONE (OUTPATIENT)
Dept: GASTROENTEROLOGY | Facility: CLINIC | Age: 58
End: 2023-05-18

## 2023-05-30 ENCOUNTER — TELEPHONE (OUTPATIENT)
Dept: GASTROENTEROLOGY | Facility: CLINIC | Age: 58
End: 2023-05-30

## 2023-05-30 NOTE — TELEPHONE ENCOUNTER
Patient rescheduled EGD   Scheduled date of EGD(as of today):06/07/2023  Physician performing EGD: Dr Sruthi Antony  Location of EGD:[facility]  Patient has instructions  Clearances: noned

## 2023-06-07 ENCOUNTER — HOSPITAL ENCOUNTER (OUTPATIENT)
Dept: GASTROENTEROLOGY | Facility: AMBULATORY SURGERY CENTER | Age: 58
Discharge: HOME | End: 2023-06-07

## 2023-06-07 VITALS
WEIGHT: 262 LBS | DIASTOLIC BLOOD PRESSURE: 91 MMHG | OXYGEN SATURATION: 100 % | RESPIRATION RATE: 18 BRPM | HEART RATE: 76 BPM | HEIGHT: 66 IN | TEMPERATURE: 98.1 F | SYSTOLIC BLOOD PRESSURE: 127 MMHG | BODY MASS INDEX: 42.11 KG/M2

## 2023-06-07 DIAGNOSIS — K25.3 ACUTE GASTRIC ULCER WITHOUT HEMORRHAGE OR PERFORATION: ICD-10-CM

## 2023-06-07 DIAGNOSIS — K20.0 EOSINOPHILIC ESOPHAGITIS: ICD-10-CM

## 2023-06-07 RX ORDER — SODIUM CHLORIDE, SODIUM LACTATE, POTASSIUM CHLORIDE, CALCIUM CHLORIDE 600; 310; 30; 20 MG/100ML; MG/100ML; MG/100ML; MG/100ML
50 INJECTION, SOLUTION INTRAVENOUS CONTINUOUS
Status: DISCONTINUED | OUTPATIENT
Start: 2023-06-07 | End: 2023-06-11 | Stop reason: HOSPADM